# Patient Record
Sex: MALE | Race: OTHER | Employment: OTHER | URBAN - NONMETROPOLITAN AREA
[De-identification: names, ages, dates, MRNs, and addresses within clinical notes are randomized per-mention and may not be internally consistent; named-entity substitution may affect disease eponyms.]

---

## 2024-01-23 ENCOUNTER — OFFICE VISIT (OUTPATIENT)
Dept: URGENT CARE | Facility: CLINIC | Age: 49
End: 2024-01-23
Payer: COMMERCIAL

## 2024-01-23 VITALS
TEMPERATURE: 98.3 F | RESPIRATION RATE: 18 BRPM | DIASTOLIC BLOOD PRESSURE: 71 MMHG | HEART RATE: 90 BPM | SYSTOLIC BLOOD PRESSURE: 134 MMHG | OXYGEN SATURATION: 99 %

## 2024-01-23 DIAGNOSIS — K08.89 PAIN, DENTAL: Primary | ICD-10-CM

## 2024-01-23 PROCEDURE — 99213 OFFICE O/P EST LOW 20 MIN: CPT

## 2024-01-23 RX ORDER — AMOXICILLIN AND CLAVULANATE POTASSIUM 875; 125 MG/1; MG/1
1 TABLET, FILM COATED ORAL EVERY 12 HOURS SCHEDULED
Qty: 14 TABLET | Refills: 0 | Status: SHIPPED | OUTPATIENT
Start: 2024-01-23 | End: 2024-01-30

## 2024-01-23 RX ORDER — CHLORHEXIDINE GLUCONATE ORAL RINSE 1.2 MG/ML
15 SOLUTION DENTAL 2 TIMES DAILY
Qty: 120 ML | Refills: 0 | Status: SHIPPED | OUTPATIENT
Start: 2024-01-23

## 2024-01-23 NOTE — PROGRESS NOTES
[facility]        NAME: Matias Carrington is a 48 y.o. male  : 1975    MRN: [id number]  DATE: 2024  TIME: 1:56 PM    Assessment and Plan   Pain, dental [K08.89]  1. Pain, dental  amoxicillin-clavulanate (AUGMENTIN) 875-125 mg per tablet    chlorhexidine (PERIDEX) 0.12 % solution        Discussed problem with patient.  Patient is having dental pain to palpation.  No obvious abnormalities.  Advised to follow-up with dentist and prescribing Augmentin and chlorhexidine for this issue.  Continue naproxen.  Also recommended ice.    Patient Instructions       Follow up with PCP in 3-5 days.  Proceed to  ER if symptoms worsen.    Chief Complaint     Chief Complaint   Patient presents with   • Dental Pain     Left upper jaw pain onset  last night         History of Present Illness       Left upper jaw pain onset  last night.  Denies any issues with his dentition.  Stated he called his dentist and representative advised him to go to urgent care to get antibiotic.  He is trying to schedule appoint with dentistry.  Denies any difficulty swallowing or chewing.    Dental Pain   This is a new problem. The current episode started yesterday. The problem occurs constantly. The problem has been unchanged. The pain is at a severity of 6/10. The pain is moderate. Pertinent negatives include no difficulty swallowing, facial pain, fever, oral bleeding, sinus pressure or thermal sensitivity. He has tried NSAIDs for the symptoms. The treatment provided moderate relief.       Review of Systems   Review of Systems   Constitutional:  Negative for appetite change, chills, fatigue and fever.   HENT:  Positive for dental problem. Negative for congestion, ear pain, postnasal drip, rhinorrhea, sinus pressure, sinus pain, sore throat and trouble swallowing.    Respiratory:  Negative for cough, shortness of breath, wheezing and stridor.    Cardiovascular:  Negative for chest pain and palpitations.         Current  Medications       Current Outpatient Medications:   •  amoxicillin-clavulanate (AUGMENTIN) 875-125 mg per tablet, Take 1 tablet by mouth every 12 (twelve) hours for 7 days, Disp: 14 tablet, Rfl: 0  •  chlorhexidine (PERIDEX) 0.12 % solution, Apply 15 mL to the mouth or throat 2 (two) times a day, Disp: 120 mL, Rfl: 0    Current Allergies     Allergies as of 01/23/2024   • (No Known Allergies)            The following portions of the patient's history were reviewed and updated as appropriate: allergies, current medications, past family history, past medical history, past social history, past surgical history and problem list.     Past Medical History:   Diagnosis Date   • Parkinsons        History reviewed. No pertinent surgical history.    History reviewed. No pertinent family history.      Medications have been verified.        Objective   /71   Pulse 90   Temp 98.3 °F (36.8 °C)   Resp 18   SpO2 99%        Physical Exam     Physical Exam  Vitals and nursing note reviewed.   Constitutional:       General: He is not in acute distress.     Appearance: Normal appearance. He is normal weight. He is not ill-appearing, toxic-appearing or diaphoretic.   HENT:      Mouth/Throat:      Lips: Pink. No lesions.      Mouth: Mucous membranes are moist.      Dentition: Does not have dentures. Dental tenderness present. No gingival swelling, dental abscesses or gum lesions.      Pharynx: Oropharynx is clear. Uvula midline. No pharyngeal swelling, oropharyngeal exudate, posterior oropharyngeal erythema or uvula swelling.   Cardiovascular:      Rate and Rhythm: Normal rate and regular rhythm.      Pulses: Normal pulses.      Heart sounds: Normal heart sounds. No murmur heard.     No friction rub. No gallop.   Pulmonary:      Effort: Pulmonary effort is normal. No respiratory distress.      Breath sounds: Normal breath sounds. No stridor. No wheezing, rhonchi or rales.   Chest:      Chest wall: No tenderness.   Neurological:       Mental Status: He is alert.

## 2024-02-23 ENCOUNTER — TELEPHONE (OUTPATIENT)
Dept: PSYCHIATRY | Facility: CLINIC | Age: 49
End: 2024-02-23

## 2024-02-23 NOTE — TELEPHONE ENCOUNTER
Patients insurance company called and lvm as pt is seeking services. Writer called patient back and transferred call to assist patient in [place]

## 2024-02-23 NOTE — TELEPHONE ENCOUNTER
Patient has been added to the [facility] wait list without a referral.    Insurance: [employer] / [employer]   Insurance Type:    Commercial []   Medicaid [x]   [facility] (if applicable)   Medicare [x]  Location Preference: [place]  Provider Preference: Female   Virtual: Yes [x] No []  Were outside resources sent: Yes [] No [x]  Advised the patient to contact his PCP for a referral.

## 2024-03-28 ENCOUNTER — OFFICE VISIT (OUTPATIENT)
Dept: URGENT CARE | Facility: CLINIC | Age: 49
End: 2024-03-28
Payer: COMMERCIAL

## 2024-03-28 VITALS
DIASTOLIC BLOOD PRESSURE: 73 MMHG | HEART RATE: 82 BPM | TEMPERATURE: 98.6 F | RESPIRATION RATE: 18 BRPM | SYSTOLIC BLOOD PRESSURE: 127 MMHG | OXYGEN SATURATION: 100 %

## 2024-03-28 DIAGNOSIS — K04.7 DENTAL ABSCESS: Primary | ICD-10-CM

## 2024-03-28 PROBLEM — G20.B2 PARKINSON'S DISEASE WITH DYSKINESIA AND FLUCTUATING MANIFESTATIONS: Status: ACTIVE | Noted: 2024-03-28

## 2024-03-28 PROCEDURE — S9083 URGENT CARE CENTER GLOBAL: HCPCS | Performed by: PHYSICIAN ASSISTANT

## 2024-03-28 PROCEDURE — 99213 OFFICE O/P EST LOW 20 MIN: CPT | Performed by: PHYSICIAN ASSISTANT

## 2024-03-28 RX ORDER — MELOXICAM 15 MG/1
15 TABLET ORAL DAILY
Qty: 7 TABLET | Refills: 0 | Status: SHIPPED | OUTPATIENT
Start: 2024-03-28 | End: 2024-04-04

## 2024-03-28 RX ORDER — CLINDAMYCIN HYDROCHLORIDE 300 MG/1
300 CAPSULE ORAL 4 TIMES DAILY
Qty: 28 CAPSULE | Refills: 0 | Status: SHIPPED | OUTPATIENT
Start: 2024-03-28 | End: 2024-04-04

## 2024-03-28 NOTE — PROGRESS NOTES
[facility]        NAME: Matias Carrington is a 48 y.o. male  : 1975    MRN: [id number]  DATE: 2024  TIME: 8:28 AM    Assessment and Plan   Dental abscess [K04.7]  1. Dental abscess  clindamycin (CLEOCIN) 300 MG capsule    meloxicam (Mobic) 15 mg tablet            Patient Instructions     Patient Instructions   Dental Abscess   WHAT YOU NEED TO KNOW:   A dental abscess is a collection of pus in or around a tooth. A dental abscess is caused by bacteria. The bacteria can enter the tooth when the enamel (outer part of the tooth) is damaged by tooth decay. Bacteria can also enter the tooth through a chip in the tooth or a cut in the gum. Food particles that are stuck between the teeth for a long time may also lead to an abscess.        DISCHARGE INSTRUCTIONS:   Return to the emergency department if:   You have severe pain in your tooth or jaw.    You have trouble breathing because of pain or swelling.    Call your doctor if:   Your symptoms get worse, even after treatment.    Your mouth is bleeding.    You cannot eat or drink because of pain or swelling.    Your abscess returns.    You have an injury that causes a crack in your tooth.    You have questions or concerns about your condition or care.    Medicines:  You may  need any of the following:  Antibiotics  help treat a bacterial infection.     NSAIDs , such as ibuprofen, help decrease swelling, pain, and fever. This medicine is available with or without a doctor's order. NSAIDs can cause stomach bleeding or kidney problems in certain people. If you take blood thinner medicine, always ask your healthcare provider if NSAIDs are safe for you. Always read the medicine label and follow directions.    Acetaminophen  decreases pain and fever. It is available without a doctor's order. Ask how much to take and how often to take it. Follow directions. Read the labels of all other medicines you are using to see if they also contain acetaminophen,  or ask your doctor or pharmacist. Acetaminophen can cause liver damage if not taken correctly.    Prescription pain medicine  may be given. Ask your healthcare provider how to take this medicine safely. Some prescription pain medicines contain acetaminophen. Do not take other medicines that contain acetaminophen without talking to your healthcare provider. Too much acetaminophen may cause liver damage. Prescription pain medicine may cause constipation. Ask your healthcare provider how to prevent or treat constipation.     Take your medicine as directed.  Contact your healthcare provider if you think your medicine is not helping or if you have side effects. Tell your provider if you are allergic to any medicine. Keep a list of the medicines, vitamins, and herbs you take. Include the amounts, and when and why you take them. Bring the list or the pill bottles to follow-up visits. Carry your medicine list with you in case of an emergency.    Self-care:   Rinse your mouth every 2 hours with salt water.  This will help keep the area clean.     Gently brush your teeth twice a day with a soft tooth brush.  This will help keep the area clean.     Eat soft foods as directed.  Soft foods may cause less pain. Examples include applesauce, yogurt, and cooked pasta. Ask your healthcare provider how long to follow this instruction.     Apply a warm compress to your tooth or gum.  Use a cotton ball or gauze soaked in warm water. Remove the compress in 10 minutes or when it becomes cool. Repeat 3 times a day.    Prevent another abscess:   Brush your teeth at least 2 times a day  with fluoride toothpaste.    Use dental floss at least once a day  to clean between your teeth.    Rinse your mouth with water or mouthwash  after meals and snacks. Chew sugarless gum.    Avoid sugary and starchy food that can stick between your teeth.  Limit drinks high in sugar, such as soda or fruit juice.    See your dentist every 6 months  for dental  cleanings and oral exams.    Follow up with your doctor or dentist in 24 hours, or as directed:  Your healthcare provider will need to check your teeth and gums. Write down your questions so you remember to ask them during your visits.   © Copyright [employer] 2023 Information is for End User's use only and may not be sold, redistributed or otherwise used for commercial purposes.  The above information is an  only. It is not intended as medical advice for individual conditions or treatments. Talk to your doctor, nurse or pharmacist before following any medical regimen to see if it is safe and effective for you.        Follow up with PCP in 3-5 days.  Proceed to  ER if symptoms worsen.    Chief Complaint     Chief Complaint   Patient presents with    Dental Pain     Left upper jaw pain onset yesterday         History of Present Illness       Patient is a 48-year-old male with past medical history significant Parkinson's who presents the clinic for dental abscess.  He states that he last saw the dentist 1 month ago.  He noticed one of his face over the last 24 hours.  He denies fevers or chills.        Review of Systems   Review of Systems   Constitutional:  Negative for chills and fatigue.   HENT:  Positive for facial swelling. Negative for congestion, dental problem, ear discharge, nosebleeds, rhinorrhea, sinus pressure, sinus pain and sneezing.    Neurological:  Negative for dizziness and headaches.         Current Medications       Current Outpatient Medications:     clindamycin (CLEOCIN) 300 MG capsule, Take 1 capsule (300 mg total) by mouth 4 (four) times a day for 7 days, Disp: 28 capsule, Rfl: 0    meloxicam (Mobic) 15 mg tablet, Take 1 tablet (15 mg total) by mouth daily for 7 days, Disp: 7 tablet, Rfl: 0    chlorhexidine (PERIDEX) 0.12 % solution, Apply 15 mL to the mouth or throat 2 (two) times a day, Disp: 120 mL, Rfl: 0    Current Allergies     Allergies as of 03/28/2024    (No Known  Allergies)            The following portions of the patient's history were reviewed and updated as appropriate: allergies, current medications, past family history, past medical history, past social history, past surgical history and problem list.     Past Medical History:   Diagnosis Date    Parkinsons        History reviewed. No pertinent surgical history.    History reviewed. No pertinent family history.      Medications have been verified.        Objective   /73   Pulse 82   Temp 98.6 °F (37 °C)   Resp 18   SpO2 100%        Physical Exam     Physical Exam  Constitutional:       Appearance: Normal appearance.   HENT:      Right Ear: Tympanic membrane normal.      Left Ear: Tympanic membrane normal.      Mouth/Throat:      Dentition: Abnormal dentition. Gingival swelling, dental caries and dental abscesses present.   Neurological:      Mental Status: He is alert.

## 2024-04-27 ENCOUNTER — OFFICE VISIT (OUTPATIENT)
Dept: URGENT CARE | Facility: CLINIC | Age: 49
End: 2024-04-27
Payer: COMMERCIAL

## 2024-04-27 VITALS
OXYGEN SATURATION: 99 % | SYSTOLIC BLOOD PRESSURE: 102 MMHG | TEMPERATURE: 98.1 F | DIASTOLIC BLOOD PRESSURE: 75 MMHG | RESPIRATION RATE: 18 BRPM | HEART RATE: 95 BPM

## 2024-04-27 DIAGNOSIS — J02.9 SORE THROAT: Primary | ICD-10-CM

## 2024-04-27 LAB — S PYO AG THROAT QL: NEGATIVE

## 2024-04-27 PROCEDURE — 87880 STREP A ASSAY W/OPTIC: CPT | Performed by: PREVENTIVE MEDICINE

## 2024-04-27 PROCEDURE — S9083 URGENT CARE CENTER GLOBAL: HCPCS | Performed by: PREVENTIVE MEDICINE

## 2024-04-27 PROCEDURE — 99213 OFFICE O/P EST LOW 20 MIN: CPT | Performed by: PREVENTIVE MEDICINE

## 2024-04-27 RX ORDER — PRAMIPEXOLE DIHYDROCHLORIDE 1 MG/1
1 TABLET ORAL 3 TIMES DAILY
COMMUNITY
Start: 2024-04-01

## 2024-04-27 RX ORDER — AMANTADINE HYDROCHLORIDE 100 MG/1
CAPSULE, GELATIN COATED ORAL
COMMUNITY
Start: 2024-04-01

## 2024-04-27 RX ORDER — TRIHEXYPHENIDYL HYDROCHLORIDE 2 MG/1
TABLET ORAL
COMMUNITY
Start: 2024-04-02

## 2024-04-27 NOTE — PROGRESS NOTES
[facility]        NAME: Matias Carrington is a 48 y.o. male  : 1975    MRN: [id number]  DATE: 2024  TIME: 3:15 PM    Assessment and Plan   Sore throat [J02.9]  1. Sore throat  POCT rapid strepA            Patient Instructions       Follow up with PCP in 3-5 days.  Proceed to  ER if symptoms worsen.    If tests have been performed at [facility], our office will contact you with results if changes need to be made to the care plan discussed with you at the visit.  [facility]    Chief Complaint     Chief Complaint   Patient presents with    Sore Throat     Onset yesterday  denies fever or chills     Headache    Cough         History of Present Illness       Sore throat body aches and some fatigue x 2 to 3 days    Sore Throat   Associated symptoms include coughing and headaches.   Headache  Cough  Associated symptoms include headaches and a sore throat.       Review of Systems   Review of Systems   HENT:  Positive for sore throat.    Respiratory:  Positive for cough.    Neurological:  Positive for headaches.         Current Medications       Current Outpatient Medications:     amantadine (SYMMETREL) 100 mg capsule, TAKE 1 TAB IN AM AND 1 AROUND LUNCH TIME, NO LATER THAN 2 PM., Disp: , Rfl:     pramipexole (MIRAPEX) 1 mg tablet, Take 1 mg by mouth 3 (three) times a day, Disp: , Rfl:     sertraline (ZOLOFT) 50 mg tablet, Take 50 mg by mouth daily, Disp: , Rfl:     trihexyphenidyl (ARTANE) 2 mg tablet, TAKE 1 TABS 3 TIMES A DAY., Disp: , Rfl:     chlorhexidine (PERIDEX) 0.12 % solution, Apply 15 mL to the mouth or throat 2 (two) times a day, Disp: 120 mL, Rfl: 0    meloxicam (Mobic) 15 mg tablet, Take 1 tablet (15 mg total) by mouth daily for 7 days, Disp: 7 tablet, Rfl: 0    Current Allergies     Allergies as of 2024    (No Known Allergies)            The following portions of the patient's history were reviewed and updated as appropriate:  allergies, current medications, past family history, past medical history, past social history, past surgical history and problem list.     Past Medical History:   Diagnosis Date    Parkinsons        No past surgical history on file.    No family history on file.      Medications have been verified.        Objective   /75   Pulse 95   Temp 98.1 °F (36.7 °C)   Resp 18   SpO2 99%   No LMP for male patient.       Physical Exam     Physical Exam  HENT:      Mouth/Throat:      Mouth: No oral lesions.      Pharynx: Posterior oropharyngeal erythema present. No oropharyngeal exudate.   Pulmonary:      Breath sounds: Normal breath sounds. No wheezing, rhonchi or rales.       Rapid strep negative

## 2024-09-27 ENCOUNTER — TELEPHONE (OUTPATIENT)
Age: 49
End: 2024-09-27

## 2025-02-10 ENCOUNTER — TELEPHONE (OUTPATIENT)
Age: 50
End: 2025-02-10

## 2025-02-10 NOTE — TELEPHONE ENCOUNTER
Contacted patient off the wait list to find out if patient still interested in services or if he found services elsewhere. Patient stated he is interested and would like to stay on the wait list until an appointment becomes available.